# Patient Record
Sex: FEMALE | Race: WHITE | Employment: OTHER | URBAN - METROPOLITAN AREA
[De-identification: names, ages, dates, MRNs, and addresses within clinical notes are randomized per-mention and may not be internally consistent; named-entity substitution may affect disease eponyms.]

---

## 2018-11-16 ENCOUNTER — TELEPHONE (OUTPATIENT)
Dept: INFECTIOUS DISEASES | Age: 58
End: 2018-11-16

## 2018-11-16 NOTE — TELEPHONE ENCOUNTER
Regarding orders on 11/9/18 labs. I received a call from Mal Bridges at [facility] and she informed me that [facility] is managing patient's PT/INR and they do not have another order to do a PT/INR until 11/26/18. I asked her for the number to [facility]. I called [facility] at [phone] and spoke with Roberth Coins and informed her of Dr Chaim Deluca recommendation to have the PT/INR level drawn every other day while on antibiotics. She voiced understanding.

## 2018-11-28 ENCOUNTER — TELEPHONE (OUTPATIENT)
Dept: INFECTIOUS DISEASES | Age: 58
End: 2018-11-28

## 2018-11-29 NOTE — TELEPHONE ENCOUNTER
I called [address] at [phone] and spoke with Mal Bridges. I informed her of Dr Chaim Deluca order. She said that she cannot order labs to be done and have the results sent to another physician for review. She said if Dr Charlie Ordonez is wanting the labs ordered then the results will be coming to him. I informed Dr Charlie Ordonez of this and of my previous conversation with Fabian Weiner at [facility] on 11/16/18. He asked that I call home care and tell them to order the labs under his name and send the results to him and to the PT/INR physician. I called Jaki and left a message on machine asking for a call back. Our phone number was provided.

## 2018-11-29 NOTE — TELEPHONE ENCOUNTER
order INR every other day while on anbx under my name and send results to jobs as well , discussed with heriberto silva

## 2018-11-29 NOTE — TELEPHONE ENCOUNTER
Jaki from [facility] called and told me she spoke with patient to inform her about the INR draws every other day that Dr Allison Arzate would like. She said patient told her that she has had the chills, cold sweats, nausea, shakiness and fatigue for the last four days. She is going to send a nurse out to patient's home today. She would like to know if Dr Allison Arzate would like any more labs drawn today while they are out there. Dr Allison Arzate, please advise. Thank you.

## 2018-12-19 ENCOUNTER — OFFICE VISIT (OUTPATIENT)
Dept: INFECTIOUS DISEASES | Age: 58
End: 2018-12-19
Payer: MEDICARE

## 2018-12-19 VITALS
SYSTOLIC BLOOD PRESSURE: 105 MMHG | TEMPERATURE: 99.3 F | HEART RATE: 68 BPM | OXYGEN SATURATION: 96 % | HEIGHT: 63 IN | RESPIRATION RATE: 16 BRPM | DIASTOLIC BLOOD PRESSURE: 69 MMHG | BODY MASS INDEX: 43.41 KG/M2 | WEIGHT: 245 LBS

## 2018-12-19 DIAGNOSIS — B95.7 COAGULASE NEGATIVE STAPHYLOCOCCUS BACTEREMIA: ICD-10-CM

## 2018-12-19 DIAGNOSIS — I38 ENDOCARDITIS, UNSPECIFIED CHRONICITY, UNSPECIFIED ENDOCARDITIS TYPE: Primary | ICD-10-CM

## 2018-12-19 DIAGNOSIS — R78.81 COAGULASE NEGATIVE STAPHYLOCOCCUS BACTEREMIA: ICD-10-CM

## 2018-12-19 DIAGNOSIS — E11.8 TYPE 2 DIABETES MELLITUS WITH COMPLICATION, UNSPECIFIED WHETHER LONG TERM INSULIN USE: ICD-10-CM

## 2018-12-19 PROBLEM — I35.8 AORTIC VALVE ENDOCARDITIS: Status: ACTIVE | Noted: 2017-05-03

## 2018-12-19 PROBLEM — K92.1 HEMATOCHEZIA: Status: ACTIVE | Noted: 2018-05-01

## 2018-12-19 PROBLEM — N17.9 ACUTE RENAL FAILURE (ARF) (HCC): Status: ACTIVE | Noted: 2017-11-14

## 2018-12-19 PROBLEM — R60.9 EDEMA: Status: ACTIVE | Noted: 2017-10-27

## 2018-12-19 PROBLEM — E78.2 MIXED HYPERLIPIDEMIA: Status: ACTIVE | Noted: 2018-06-13

## 2018-12-19 PROBLEM — L97.509 FOOT ULCER (HCC): Status: ACTIVE | Noted: 2017-10-27

## 2018-12-19 PROBLEM — Z79.891 LONG TERM PRESCRIPTION OPIATE USE: Status: ACTIVE | Noted: 2017-10-27

## 2018-12-19 PROBLEM — B95.8 ENDOCARDITIS DUE TO STAPHYLOCOCCUS: Status: ACTIVE | Noted: 2018-11-21

## 2018-12-19 PROBLEM — K08.9 POOR DENTITION: Status: ACTIVE | Noted: 2018-01-19

## 2018-12-19 PROBLEM — K59.1 FUNCTIONAL DIARRHEA: Status: ACTIVE | Noted: 2018-06-05

## 2018-12-19 PROBLEM — Z86.73 HISTORY OF CVA (CEREBROVASCULAR ACCIDENT): Status: ACTIVE | Noted: 2018-03-01

## 2018-12-19 PROBLEM — E11.49 TYPE 2 DIABETES MELLITUS WITH NEUROLOGIC COMPLICATION (HCC): Status: ACTIVE | Noted: 2017-12-06

## 2018-12-19 PROBLEM — J44.9 CHRONIC AIRWAY OBSTRUCTION (HCC): Status: ACTIVE | Noted: 2017-05-03

## 2018-12-19 PROBLEM — E03.9 HYPOTHYROIDISM: Status: ACTIVE | Noted: 2017-02-24

## 2018-12-19 PROBLEM — I33.0 ENDOCARDITIS DUE TO STAPHYLOCOCCUS: Status: ACTIVE | Noted: 2018-11-21

## 2018-12-19 PROBLEM — I70.213 ATHEROSCLEROSIS OF NATIVE ARTERY OF BOTH LOWER EXTREMITIES WITH INTERMITTENT CLAUDICATION (HCC): Status: ACTIVE | Noted: 2017-02-28

## 2018-12-19 PROBLEM — R07.9 CHEST PAIN: Status: ACTIVE | Noted: 2018-11-02

## 2018-12-19 PROBLEM — Z98.1 S/P LAMINECTOMY WITH SPINAL FUSION: Status: ACTIVE | Noted: 2017-02-24

## 2018-12-19 PROBLEM — E66.01 SEVERE OBESITY (BMI 35.0-39.9) WITH COMORBIDITY (HCC): Status: ACTIVE | Noted: 2018-07-25

## 2018-12-19 PROBLEM — R68.89 SUSPECTED INFECTIOUS DISEASE: Status: ACTIVE | Noted: 2018-11-29

## 2018-12-19 PROBLEM — R29.898 WEAKNESS OF LEFT LEG: Status: ACTIVE | Noted: 2017-02-04

## 2018-12-19 PROBLEM — I25.10 CORONARY ATHEROSCLEROSIS: Status: ACTIVE | Noted: 2017-05-03

## 2018-12-19 PROBLEM — I67.1 BRAIN ANEURYSM: Status: ACTIVE | Noted: 2017-05-03

## 2018-12-19 PROBLEM — I28.9 DISEASE OF PULMONARY CIRCULATION (HCC): Status: ACTIVE | Noted: 2017-05-03

## 2018-12-19 PROBLEM — I10 HYPERTENSION: Status: ACTIVE | Noted: 2017-02-24

## 2018-12-19 PROBLEM — F32.9 REACTIVE DEPRESSION: Status: ACTIVE | Noted: 2018-03-26

## 2018-12-19 PROCEDURE — G8598 ASA/ANTIPLAT THER USED: HCPCS | Performed by: INTERNAL MEDICINE

## 2018-12-19 PROCEDURE — 99213 OFFICE O/P EST LOW 20 MIN: CPT | Performed by: INTERNAL MEDICINE

## 2018-12-19 PROCEDURE — G8484 FLU IMMUNIZE NO ADMIN: HCPCS | Performed by: INTERNAL MEDICINE

## 2018-12-19 PROCEDURE — G8417 CALC BMI ABV UP PARAM F/U: HCPCS | Performed by: INTERNAL MEDICINE

## 2018-12-19 PROCEDURE — 2022F DILAT RTA XM EVC RTNOPTHY: CPT | Performed by: INTERNAL MEDICINE

## 2018-12-19 PROCEDURE — G8427 DOCREV CUR MEDS BY ELIG CLIN: HCPCS | Performed by: INTERNAL MEDICINE

## 2018-12-19 PROCEDURE — 3046F HEMOGLOBIN A1C LEVEL >9.0%: CPT | Performed by: INTERNAL MEDICINE

## 2018-12-19 PROCEDURE — 3017F COLORECTAL CA SCREEN DOC REV: CPT | Performed by: INTERNAL MEDICINE

## 2018-12-19 PROCEDURE — 1036F TOBACCO NON-USER: CPT | Performed by: INTERNAL MEDICINE

## 2018-12-19 RX ORDER — LIDOCAINE 40 MG/G
4 CREAM TOPICAL
COMMUNITY

## 2018-12-19 RX ORDER — CITALOPRAM 20 MG/1
TABLET ORAL
COMMUNITY
Start: 2018-12-10

## 2018-12-19 RX ORDER — HYOSCYAMINE SULFATE 0.125 MG
0.12 TABLET ORAL
COMMUNITY
Start: 2018-07-24

## 2018-12-19 RX ORDER — ONDANSETRON 4 MG/1
4 TABLET, FILM COATED ORAL
COMMUNITY
Start: 2018-07-24

## 2018-12-19 RX ORDER — 0.9 % SODIUM CHLORIDE 0.9 %
10 VIAL (ML) INJECTION
COMMUNITY

## 2018-12-19 RX ORDER — CIPROFLOXACIN AND DEXAMETHASONE 3; 1 MG/ML; MG/ML
SUSPENSION/ DROPS AURICULAR (OTIC)
COMMUNITY
Start: 2018-12-11

## 2018-12-19 RX ORDER — BUPRENORPHINE AND NALOXONE 2; .5 MG/1; MG/1
FILM, SOLUBLE BUCCAL; SUBLINGUAL
COMMUNITY

## 2018-12-19 RX ORDER — NITROGLYCERIN 0.4 MG/1
0.4 TABLET SUBLINGUAL
COMMUNITY

## 2018-12-19 RX ORDER — BUPRENORPHINE HYDROCHLORIDE AND NALOXONE HYDROCHLORIDE DIHYDRATE 8; 2 MG/1; MG/1
0.25 TABLET SUBLINGUAL
COMMUNITY

## 2018-12-19 RX ORDER — CLOPIDOGREL BISULFATE 75 MG/1
75 TABLET ORAL
COMMUNITY
Start: 2018-10-09

## 2018-12-19 RX ORDER — PREGABALIN 150 MG/1
300 CAPSULE ORAL
COMMUNITY
Start: 2018-07-06

## 2018-12-19 RX ORDER — BACLOFEN 10 MG/1
5-10 TABLET ORAL
COMMUNITY
Start: 2018-06-06

## 2018-12-19 RX ORDER — LEVOTHYROXINE SODIUM 175 UG/1
175 TABLET ORAL
COMMUNITY
Start: 2018-12-18

## 2018-12-19 RX ORDER — ISOSORBIDE MONONITRATE 60 MG/1
60 TABLET, EXTENDED RELEASE ORAL
COMMUNITY
Start: 2018-02-12

## 2018-12-19 RX ORDER — FUROSEMIDE 40 MG/1
40 TABLET ORAL
COMMUNITY
Start: 2018-09-14

## 2018-12-19 RX ORDER — PANTOPRAZOLE SODIUM 40 MG/1
TABLET, DELAYED RELEASE ORAL
COMMUNITY
Start: 2018-08-01

## 2018-12-19 RX ORDER — ATORVASTATIN CALCIUM 10 MG/1
10 TABLET, FILM COATED ORAL
COMMUNITY
Start: 2018-07-30

## 2018-12-19 RX ORDER — DULOXETIN HYDROCHLORIDE 30 MG/1
30 CAPSULE, DELAYED RELEASE ORAL
COMMUNITY
Start: 2017-11-22

## 2018-12-19 RX ORDER — WARFARIN SODIUM 3 MG/1
TABLET ORAL
COMMUNITY

## 2018-12-19 RX ORDER — METOPROLOL SUCCINATE 50 MG/1
50 TABLET, EXTENDED RELEASE ORAL
COMMUNITY
Start: 2017-11-22

## 2018-12-19 RX ORDER — TOPIRAMATE 50 MG/1
50 TABLET, FILM COATED ORAL
COMMUNITY
Start: 2018-08-13

## 2019-01-10 DIAGNOSIS — I38 ENDOCARDITIS, UNSPECIFIED CHRONICITY, UNSPECIFIED ENDOCARDITIS TYPE: ICD-10-CM

## 2019-02-07 ENCOUNTER — OFFICE VISIT (OUTPATIENT)
Dept: INFECTIOUS DISEASES | Age: 59
End: 2019-02-07
Payer: MEDICARE

## 2019-02-07 VITALS
BODY MASS INDEX: 38.62 KG/M2 | WEIGHT: 218 LBS | TEMPERATURE: 98.3 F | DIASTOLIC BLOOD PRESSURE: 77 MMHG | HEIGHT: 63 IN | HEART RATE: 69 BPM | SYSTOLIC BLOOD PRESSURE: 137 MMHG

## 2019-02-07 DIAGNOSIS — I38 ENDOCARDITIS, UNSPECIFIED CHRONICITY, UNSPECIFIED ENDOCARDITIS TYPE: ICD-10-CM

## 2019-02-07 DIAGNOSIS — B95.7 COAGULASE NEGATIVE STAPHYLOCOCCUS BACTEREMIA: ICD-10-CM

## 2019-02-07 DIAGNOSIS — L74.9 SWEATING ABNORMALITY: Primary | ICD-10-CM

## 2019-02-07 DIAGNOSIS — E11.8 TYPE 2 DIABETES MELLITUS WITH COMPLICATION, UNSPECIFIED WHETHER LONG TERM INSULIN USE: ICD-10-CM

## 2019-02-07 DIAGNOSIS — R78.81 COAGULASE NEGATIVE STAPHYLOCOCCUS BACTEREMIA: ICD-10-CM

## 2019-02-07 PROCEDURE — 3046F HEMOGLOBIN A1C LEVEL >9.0%: CPT | Performed by: INTERNAL MEDICINE

## 2019-02-07 PROCEDURE — 2022F DILAT RTA XM EVC RTNOPTHY: CPT | Performed by: INTERNAL MEDICINE

## 2019-02-07 PROCEDURE — G8598 ASA/ANTIPLAT THER USED: HCPCS | Performed by: INTERNAL MEDICINE

## 2019-02-07 PROCEDURE — G8417 CALC BMI ABV UP PARAM F/U: HCPCS | Performed by: INTERNAL MEDICINE

## 2019-02-07 PROCEDURE — G8427 DOCREV CUR MEDS BY ELIG CLIN: HCPCS | Performed by: INTERNAL MEDICINE

## 2019-02-07 PROCEDURE — 3017F COLORECTAL CA SCREEN DOC REV: CPT | Performed by: INTERNAL MEDICINE

## 2019-02-07 PROCEDURE — 99213 OFFICE O/P EST LOW 20 MIN: CPT | Performed by: INTERNAL MEDICINE

## 2019-02-07 PROCEDURE — G8484 FLU IMMUNIZE NO ADMIN: HCPCS | Performed by: INTERNAL MEDICINE

## 2019-02-07 PROCEDURE — 1036F TOBACCO NON-USER: CPT | Performed by: INTERNAL MEDICINE

## 2019-02-13 DIAGNOSIS — L74.9 SWEATING ABNORMALITY: ICD-10-CM

## 2019-02-25 DIAGNOSIS — L74.9 SWEATING ABNORMALITY: ICD-10-CM

## 2019-03-07 ENCOUNTER — OFFICE VISIT (OUTPATIENT)
Dept: INFECTIOUS DISEASES | Age: 59
End: 2019-03-07
Payer: MEDICARE

## 2019-03-07 VITALS
DIASTOLIC BLOOD PRESSURE: 70 MMHG | TEMPERATURE: 98.8 F | WEIGHT: 218 LBS | HEIGHT: 63 IN | BODY MASS INDEX: 38.62 KG/M2 | SYSTOLIC BLOOD PRESSURE: 115 MMHG | HEART RATE: 78 BPM

## 2019-03-07 DIAGNOSIS — L74.9 SWEATING ABNORMALITY: Primary | ICD-10-CM

## 2019-03-07 DIAGNOSIS — I38 ENDOCARDITIS, UNSPECIFIED CHRONICITY, UNSPECIFIED ENDOCARDITIS TYPE: ICD-10-CM

## 2019-03-07 PROCEDURE — G8484 FLU IMMUNIZE NO ADMIN: HCPCS | Performed by: INTERNAL MEDICINE

## 2019-03-07 PROCEDURE — G8598 ASA/ANTIPLAT THER USED: HCPCS | Performed by: INTERNAL MEDICINE

## 2019-03-07 PROCEDURE — G8417 CALC BMI ABV UP PARAM F/U: HCPCS | Performed by: INTERNAL MEDICINE

## 2019-03-07 PROCEDURE — 1036F TOBACCO NON-USER: CPT | Performed by: INTERNAL MEDICINE

## 2019-03-07 PROCEDURE — G8427 DOCREV CUR MEDS BY ELIG CLIN: HCPCS | Performed by: INTERNAL MEDICINE

## 2019-03-07 PROCEDURE — 99213 OFFICE O/P EST LOW 20 MIN: CPT | Performed by: INTERNAL MEDICINE

## 2019-03-07 PROCEDURE — 3017F COLORECTAL CA SCREEN DOC REV: CPT | Performed by: INTERNAL MEDICINE

## 2019-03-07 RX ORDER — DOXYCYCLINE HYCLATE 100 MG/1
100 CAPSULE ORAL 2 TIMES DAILY
COMMUNITY

## 2019-03-07 RX ORDER — SUB-Q INSULIN DEVICE, 40 UNIT
EACH MISCELLANEOUS
COMMUNITY

## 2019-07-09 ENCOUNTER — TELEPHONE (OUTPATIENT)
Dept: INFECTIOUS DISEASES | Age: 59
End: 2019-07-09

## 2019-07-16 PROBLEM — K31.84 GASTROPARESIS: Status: ACTIVE | Noted: 2019-03-13

## 2019-07-24 ENCOUNTER — OFFICE VISIT (OUTPATIENT)
Dept: INFECTIOUS DISEASES | Age: 59
End: 2019-07-24
Payer: MEDICARE

## 2019-07-24 VITALS
DIASTOLIC BLOOD PRESSURE: 70 MMHG | HEART RATE: 68 BPM | WEIGHT: 234 LBS | TEMPERATURE: 97 F | BODY MASS INDEX: 41.46 KG/M2 | SYSTOLIC BLOOD PRESSURE: 119 MMHG | HEIGHT: 63 IN

## 2019-07-24 DIAGNOSIS — I38 ENDOCARDITIS, UNSPECIFIED CHRONICITY, UNSPECIFIED ENDOCARDITIS TYPE: ICD-10-CM

## 2019-07-24 DIAGNOSIS — L74.9 SWEATING ABNORMALITY: Primary | ICD-10-CM

## 2019-07-24 DIAGNOSIS — R53.83 TIRED: ICD-10-CM

## 2019-07-24 DIAGNOSIS — R05.9 COUGH: ICD-10-CM

## 2019-07-24 PROCEDURE — G8417 CALC BMI ABV UP PARAM F/U: HCPCS | Performed by: INTERNAL MEDICINE

## 2019-07-24 PROCEDURE — 3017F COLORECTAL CA SCREEN DOC REV: CPT | Performed by: INTERNAL MEDICINE

## 2019-07-24 PROCEDURE — G8427 DOCREV CUR MEDS BY ELIG CLIN: HCPCS | Performed by: INTERNAL MEDICINE

## 2019-07-24 PROCEDURE — G8598 ASA/ANTIPLAT THER USED: HCPCS | Performed by: INTERNAL MEDICINE

## 2019-07-24 PROCEDURE — 1036F TOBACCO NON-USER: CPT | Performed by: INTERNAL MEDICINE

## 2019-07-24 PROCEDURE — 99213 OFFICE O/P EST LOW 20 MIN: CPT | Performed by: INTERNAL MEDICINE

## 2019-07-24 NOTE — PATIENT INSTRUCTIONS
Called Cardiology- Dr Shannan Springer [phone]  Left message for them to contact Sayra Harris to schedule ALEX- pt to follow up if they don't call back today. [facility] Springer office called, they will not schedule until they receive office note. Sent Dr Ana Rosa Alvarez perfect serve message to dictate. Fax: [phone]   7/25/19 called pt to inform- her partner informed me that they received a call this morning to schedule.

## 2019-07-25 NOTE — PROGRESS NOTES
[facility]    Follow-up NOTE           Visit date: 7/24/2019      Reason for visit /chief complaints   sweating    Assessment:     Encounter Diagnoses   Name Primary?  Sweating abnormality Yes    Endocarditis, unspecified chronicity, unspecified endocarditis type      Cough     Tired           Plan:     Check ALEX  Her culture no growth so far  Check blood culture urine culture  Labs ordered   Check chest x-ray    HPI:     Patient is a 51-year-old female came for evaluation of possible infection  Patient was treated for av endocarditis in the past.  She was referred back by cardiology for evaluation of recurrent infection because of some intermittent chills. sweating     No cough or shortness of breath. No vomiting or diarrhea.   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    Foot ulcer ([address] 10/27/2017    Functional diarrhea 6/5/2018    Overview:  EGD neg including biopsies on 5/30/18    Gastroparesis 3/13/2019    Hematochezia 5/1/2018    History of CVA (cerebrovascular accident) 3/1/2018    Hypertension 2/24/2017    Hypothyroidism 2/24/2017    Long term (current) use of anticoagulants 7/28/2016    Long term prescription opiate use 10/27/2017    Mixed hyperlipidemia 6/13/2018    Poor dentition 1/19/2018    PVD (peripheral vascular disease) ([address] 7/28/2016    Reactive depression 3/26/2018    S/P laminectomy with spinal fusion 2/24/2017    tablet, Take 10 mg by mouth, Disp: , Rfl:     baclofen (LIORESAL) 10 MG tablet, Take 5-10 mg by mouth, Disp: , Rfl:     buprenorphine-naloxone (SUBOXONE) 2-0.5 MG FILM SL film, Place under the tongue. ., Disp: , Rfl:     buprenorphine-naloxone (SUBOXONE) 8-2 MG SUBL SL tablet, Place 0.25 tablets under the tongue. ., Disp: , Rfl:     citalopram (CELEXA) 20 MG tablet, TAKE 1 TABLET AT BEDTIME, Disp: , Rfl:     clopidogrel (PLAVIX) 75 MG tablet, Take 75 mg by mouth, Disp: , Rfl:     DULoxetine (CYMBALTA) 30 MG extended release capsule, Take 30 mg by mouth, Disp: , Rfl:     furosemide (LASIX) 40 MG tablet, Take 40 mg by mouth, Disp: , Rfl:     hyoscyamine (ANASPAZ;LEVSIN) 125 MCG tablet, Take 0.125 mg by mouth, Disp: , Rfl:     isosorbide mononitrate (IMDUR) 60 MG extended release tablet, Take 60 mg by mouth, Disp: , Rfl:     levothyroxine (SYNTHROID) 175 MCG tablet, Take 175 mcg by mouth, Disp: , Rfl:     metoprolol succinate (TOPROL XL) 50 MG extended release tablet, Take 50 mg by mouth, Disp: , Rfl:     nitroGLYCERIN (NITROSTAT) 0.4 MG SL tablet, Place 0.4 mg under the tongue, Disp: , Rfl:     insulin aspart (NOVOLOG) 100 UNIT/ML injection vial, INJECTING UP 40 UNITS DAILY VIA V-GO, Disp: , Rfl:     ondansetron (ZOFRAN) 4 MG tablet, Take 4 mg by mouth, Disp: , Rfl:     pantoprazole (PROTONIX) 40 MG tablet, TAKE 1 TABLET EVERY DAY, Disp: , Rfl:     pregabalin (LYRICA) 150 MG capsule, Take 300 mg by mouth. ., Disp: , Rfl:     topiramate (TOPAMAX) 50 MG tablet, Take 50 mg by mouth, Disp: , Rfl:     warfarin (COUMADIN) 3 MG tablet, Takes warfarin 3 mg on Mon, Fri and 2 mg all other days.  INR goal is 2-3. , Disp: , Rfl:     ciprofloxacin-dexamethasone (CIPRODEX) 0.3-0.1 % otic suspension, Place in ear(s), Disp: , Rfl:     lidocaine (ASPERCREME W/LIDOCAINE) 4 % cream, Apply 4 % topically, Disp: , Rfl:     sodium chloride, PF, 0.9 % injection, Infuse 10 mLs intravenously, Disp: , Rfl:      Review of Systems:  CONSTITUTIONAL:  Sweating   EYES:  negative  HEENT:  negative  RESPIRATORY:  negative  CARDIOVASCULAR:  negative  GASTROINTESTINAL:  negative  GENITOURINARY:  negative  INTEGUMENT/BREAST:  negative  HEMATOLOGIC/LYMPHATIC:  negative  ALLERGIC/IMMUNOLOGIC:  negative  ENDOCRINE:  negative  MUSCULOSKELETAL:  negative  NEUROLOGICAL:  negative  BEHAVIOR/PSYCH:  negative    /70 (Site: Right Upper Arm)   Pulse 68   Temp 97 °F (36.1 °C)   Ht 5' 3\" (1.6 m)   Wt 234 lb (106.1 kg)   BMI 41.45 kg/m²       EXAM:  CONSTITUTIONAL:  awake, alert, cooperative, no apparent distress,  EYES: conjunctiva normal  ENT:  Normocephalic, without obvious abnormality, atraumatic,  LUNGS:  No increased work of breathing, good air exchange, clear to auscultation bilaterally, no crackles or wheezing  CARDIOVASCULAR:  regular rate and rhythm, normal S1 and S2,  no murmur noted  ABDOMEN:   normal bowel sounds, soft, non-distended, non-tender, no masses palpated, no hepatosplenomegally,   MUSCULOSKELETAL:  There is no redness, warmth, or swelling of the joints. NEUROLOGIC:  Awake, alert, oriented to name, place and time  SKIN:  No rash      Data Review:    Reviewed   IMAGING:          Trenton Do MD  7/24/2019  11:02 PM      This note was completed using a voice transcription system. Every effort was made to ensure accuracy. However, inadvertent computerized transcription errors may be present.

## 2019-08-06 DIAGNOSIS — L74.9 SWEATING ABNORMALITY: ICD-10-CM

## 2019-08-06 DIAGNOSIS — R53.83 TIRED: ICD-10-CM

## 2019-08-06 DIAGNOSIS — I38 ENDOCARDITIS, UNSPECIFIED CHRONICITY, UNSPECIFIED ENDOCARDITIS TYPE: ICD-10-CM

## 2019-08-06 DIAGNOSIS — R05.9 COUGH: ICD-10-CM

## 2019-08-07 ENCOUNTER — OFFICE VISIT (OUTPATIENT)
Dept: INFECTIOUS DISEASES | Age: 59
End: 2019-08-07
Payer: MEDICARE

## 2019-08-07 VITALS
BODY MASS INDEX: 40.75 KG/M2 | WEIGHT: 230 LBS | SYSTOLIC BLOOD PRESSURE: 119 MMHG | HEART RATE: 66 BPM | TEMPERATURE: 97.6 F | HEIGHT: 63 IN | DIASTOLIC BLOOD PRESSURE: 72 MMHG

## 2019-08-07 DIAGNOSIS — E11.8 TYPE 2 DIABETES MELLITUS WITH COMPLICATION, UNSPECIFIED WHETHER LONG TERM INSULIN USE: ICD-10-CM

## 2019-08-07 DIAGNOSIS — I38 ENDOCARDITIS, UNSPECIFIED CHRONICITY, UNSPECIFIED ENDOCARDITIS TYPE: Primary | ICD-10-CM

## 2019-08-07 DIAGNOSIS — L74.9 SWEATING ABNORMALITY: ICD-10-CM

## 2019-08-07 PROCEDURE — 99213 OFFICE O/P EST LOW 20 MIN: CPT | Performed by: INTERNAL MEDICINE

## 2019-08-07 PROCEDURE — G8598 ASA/ANTIPLAT THER USED: HCPCS | Performed by: INTERNAL MEDICINE

## 2019-08-07 PROCEDURE — 1036F TOBACCO NON-USER: CPT | Performed by: INTERNAL MEDICINE

## 2019-08-07 PROCEDURE — G8427 DOCREV CUR MEDS BY ELIG CLIN: HCPCS | Performed by: INTERNAL MEDICINE

## 2019-08-07 PROCEDURE — 3046F HEMOGLOBIN A1C LEVEL >9.0%: CPT | Performed by: INTERNAL MEDICINE

## 2019-08-07 PROCEDURE — G8417 CALC BMI ABV UP PARAM F/U: HCPCS | Performed by: INTERNAL MEDICINE

## 2019-08-07 PROCEDURE — 2022F DILAT RTA XM EVC RTNOPTHY: CPT | Performed by: INTERNAL MEDICINE

## 2019-08-07 PROCEDURE — 3017F COLORECTAL CA SCREEN DOC REV: CPT | Performed by: INTERNAL MEDICINE

## 2019-08-08 NOTE — PROGRESS NOTES
([facility] 2016    Reactive depression 3/26/2018    S/P laminectomy with spinal fusion 2017    Severe obesity (BMI 35.0-39. 9) with comorbidity ([facility] 2018    Suspected infectious disease 2018    Type 2 diabetes mellitus with neurologic complication ([facility]     Weakness of left leg 2017     History reviewed. No pertinent surgical history. Social History     Socioeconomic History    Marital status: Single     Spouse name: None    Number of children: None    Years of education: None    Highest education level: None   Occupational History    None   Social Needs    Financial resource strain: None    Food insecurity:     Worry: None     Inability: None    Transportation needs:     Medical: None     Non-medical: None   Tobacco Use    Smoking status: Former Smoker     Types: Cigarettes     Last attempt to quit:      Years since quittin.6    Smokeless tobacco: Never Used   Substance and Sexual Activity    Alcohol use: None    Drug use: None    Sexual activity: None   Lifestyle    Physical activity:     Days per week: None     Minutes per session: None    Stress: None   Relationships    Social connections:     Talks on phone: None     Gets together: None     Attends Restorationist service: None     Active member of club or organization: None     Attends meetings of clubs or organizations: None     Relationship status: None    Intimate partner violence:     Fear of current or ex partner: None     Emotionally abused: None     Physically abused: None     Forced sexual activity: None   Other Topics Concern    None   Social History Narrative    None     History reviewed. No pertinent family history.     Current med     Current Outpatient Medications:     doxycycline hyclate (VIBRAMYCIN) 100 MG capsule, Take 100 mg by mouth 2 times daily, Disp: , Rfl:     Insulin Disposable Pump (V-GO 20) KIT, by Does not apply route, Disp: , Rfl:     insulin glargine (LANTUS SOLOSTAR) 100

## 2019-08-09 DIAGNOSIS — L74.9 SWEATING ABNORMALITY: ICD-10-CM
